# Patient Record
Sex: FEMALE | Race: WHITE | Employment: FULL TIME | URBAN - METROPOLITAN AREA
[De-identification: names, ages, dates, MRNs, and addresses within clinical notes are randomized per-mention and may not be internally consistent; named-entity substitution may affect disease eponyms.]

---

## 2019-12-30 ENCOUNTER — HOSPITAL ENCOUNTER (OUTPATIENT)
Age: 29
Discharge: HOME | End: 2019-12-30

## 2019-12-30 PROCEDURE — 86481 TB AG RESPONSE T-CELL SUSP: CPT

## 2020-01-07 LAB — T-SPOT TB TEST: NORMAL

## 2021-01-19 ENCOUNTER — HOSPITAL ENCOUNTER (EMERGENCY)
Age: 31
Discharge: HOME | End: 2021-01-20
Attending: EMERGENCY MEDICINE
Payer: COMMERCIAL

## 2021-01-19 VITALS
RESPIRATION RATE: 18 BRPM | TEMPERATURE: 98.4 F | SYSTOLIC BLOOD PRESSURE: 122 MMHG | OXYGEN SATURATION: 100 % | HEART RATE: 84 BPM | DIASTOLIC BLOOD PRESSURE: 62 MMHG

## 2021-01-19 DIAGNOSIS — Z77.21 EXPOSURE TO BLOOD OR BODY FLUID: Primary | ICD-10-CM

## 2021-01-19 PROCEDURE — 99282 EMERGENCY DEPT VISIT SF MDM: CPT

## 2021-01-20 ASSESSMENT — ENCOUNTER SYMPTOMS
COLOR CHANGE: 0
FACIAL SWELLING: 0
EYE REDNESS: 1
EYE ITCHING: 0

## 2021-01-20 ASSESSMENT — VISUAL ACUITY: OU: 1

## 2021-01-20 NOTE — ED PROVIDER NOTES
[facility] ED  Emergency Department Encounter  EmergencyMedicine Resident     Pt Name:Ashleigh Cooley  MRN: [id number]  [place] 1990  Date of evaluation: 1/19/21  PCP:  Cameron Marquez DO    CHIEF COMPLAINT       Chief Complaint   Patient presents with    Body Fluid Exposure       HISTORY OF PRESENT ILLNESS  (Location/Symptom, Timing/Onset, Context/Setting, Quality, Duration, Modifying Factors, Severity.)      Taurus Tatum is a 27 y.o. female who presents with positive urine in her right eye. Patient rinsed it out with 5 10 cc syringes of saline. Some irritation to the eye, no burning,     PAST MEDICAL / SURGICAL / SOCIAL / FAMILY HISTORY      has a past medical history of FHx: allergies. None     has no past surgical history on file.   None    Social History     Socioeconomic History    Marital status: Single     Spouse name: Not on file    Number of children: Not on file    Years of education: Not on file    Highest education level: Not on file   Occupational History    Not on file   Social Needs    Financial resource strain: Not on file    Food insecurity     Worry: Not on file     Inability: Not on file    Transportation needs     Medical: Not on file     Non-medical: Not on file   Tobacco Use    Smoking status: Never Smoker    Smokeless tobacco: Never Used   Substance and Sexual Activity    Alcohol use: No    Drug use: No    Sexual activity: Not on file   Lifestyle    Physical activity     Days per week: Not on file     Minutes per session: Not on file    Stress: Not on file   Relationships    Social connections     Talks on phone: Not on file     Gets together: Not on file     Attends Jain service: Not on file     Active member of club or organization: Not on file     Attends meetings of clubs or organizations: Not on file     Relationship status: Not on file    Intimate partner violence     Fear of current or ex partner: Not on file     Emotionally abused: Not on file     Physically abused: Not on file     Forced sexual activity: Not on file   Other Topics Concern    Not on file   Social History Narrative    Not on file       Family History   Problem Relation Age of Onset    Allergies Mother    Elfrkevin Hale Migraines Mother     Diabetes Sister        Allergies:  Sulfa antibiotics    Home Medications:  Prior to Admission medications    Medication Sig Start Date End Date Taking? Authorizing Provider   Norgestim-Eth Estrad Triphasic (ORTHO TRI-CYCLEN, 27,) 0.18/0.215/0.25 MG-35 MCG TABS Take  by mouth. Historical Provider, MD       REVIEW OF SYSTEMS    (2-9 systems for level 4, 10 or more for level 5)      Review of Systems   HENT: Negative for facial swelling. Eyes: Positive for redness. Negative for itching. Skin: Negative for color change. PHYSICAL EXAM   (up to 7 for level 4, 8 or more for level 5)      INITIAL VITALS:   /62   Pulse 84   Temp 98.4 °F (36.9 °C) (Oral)   Resp 18   SpO2 100%     Physical Exam  Eyes:      General: Vision grossly intact. Extraocular Movements: Extraocular movements intact. Right eye: Normal extraocular motion. Left eye: Normal extraocular motion. Conjunctiva/sclera:      Right eye: Right conjunctiva is injected. No exudate or hemorrhage. Left eye: Left conjunctiva is not injected. No exudate or hemorrhage. Pupils: Pupils are equal, round, and reactive to light. DIFFERENTIAL  DIAGNOSIS     PLAN (LABS / IMAGING / EKG):  No orders of the defined types were placed in this encounter. MEDICATIONS ORDERED:  No orders of the defined types were placed in this encounter. DDXEye exposure    DIAGNOSTIC RESULTS / EMERGENCY DEPARTMENT COURSE / MDM   LAB RESULTS:  No results found for this visit on 01/19/21.     RADIOLOGY:  No orders to display          EMERGENCY DEPARTMENT COURSE:  ED Course as of Jan 20 2206 Wed Jan 20, 2021   0009 Irrigating with a liter of lactated Ringer fluid through patient's right eye. [CR]   0030 Irrigation last about 15 minutes, irrigation complete    [CR]      ED Course User Index  [CR] Aurelia Lawler DO      This is a work exposure, paperwork filled out and charge nurse worked on obtaining patient's lab values for further evaluation, paperwork was submitted to occupational health. PROCEDURES:  None    CONSULTS:  None    MEDICAL DECISION MAKING:  See ED course    CRITICAL CARE:  Please see attending note    FINAL IMPRESSION      1. Exposure to blood or body fluid          DISPOSITION / PLAN     DISPOSITION Decision To Discharge 01/20/2021 12:31:49 AM      PATIENT REFERRED TO:  No follow-up provider specified.     DISCHARGE MEDICATIONS:  Discharge Medication List as of 1/20/2021 12:51 AM          Seb Carcamo DO  Emergency Medicine Resident    (Please note that portions of thisnote were completed with a voice recognition program.  Efforts were made to edit the dictations but occasionally words are mis-transcribed.)       Aurelia Lawler DO  Resident  01/20/21 [id number] RENITA Charles DO  Resident  01/20/21 [id number]

## 2021-01-20 NOTE — ED PROVIDER NOTES
[facility]     Emergency Department     Faculty Attestation    I performed a history and physical examination of the patient and discussed management with the resident. I reviewed the residents note and agree with the documented findings and plan of care. Any areas of disagreement are noted on the chart. I was personally present for the key portions of any procedures. I have documented in the chart those procedures where I was not present during the key portions. I have reviewed the emergency nurses triage note. I agree with the chief complaint, past medical history, past surgical history, allergies, medications, social and family history as documented unless otherwise noted below. For Physician Assistant/ Nurse Practitioner cases/documentation I have personally evaluated this patient and have completed at least one if not all key elements of the E/M (history, physical exam, and MDM). Additional findings are as noted. I have personally seen and evaluated the patient. I find the patient's history and physical exam are consistent with the NP/PA documentation. I agree with the care provided, treatment rendered, disposition and follow-up plan. Critical Care     Keith Ahuja M.D.   Attending Emergency  Physician              Salena Andrea MD  01/20/21 [id number]

## 2021-02-01 ENCOUNTER — HOSPITAL ENCOUNTER (OUTPATIENT)
Age: 31
Discharge: HOME | End: 2021-02-01
Payer: COMMERCIAL

## 2021-02-15 ENCOUNTER — HOSPITAL ENCOUNTER (OUTPATIENT)
Age: 31
Discharge: HOME | End: 2021-02-15
Payer: COMMERCIAL